# Patient Record
Sex: FEMALE | URBAN - METROPOLITAN AREA
[De-identification: names, ages, dates, MRNs, and addresses within clinical notes are randomized per-mention and may not be internally consistent; named-entity substitution may affect disease eponyms.]

---

## 2020-06-09 ENCOUNTER — APPOINTMENT (OUTPATIENT)
Age: 60
Setting detail: DERMATOLOGY
End: 2020-06-12

## 2020-06-09 DIAGNOSIS — I87.2 VENOUS INSUFFICIENCY (CHRONIC) (PERIPHERAL): ICD-10-CM

## 2020-06-09 PROCEDURE — OTHER RECOMMENDATIONS: OTHER

## 2020-06-09 PROCEDURE — OTHER DEFER: OTHER

## 2020-06-09 PROCEDURE — 99202 OFFICE O/P NEW SF 15 MIN: CPT

## 2020-06-09 PROCEDURE — OTHER PRESCRIPTION: OTHER

## 2020-06-09 PROCEDURE — OTHER TREATMENT REGIMEN: OTHER

## 2020-06-09 PROCEDURE — OTHER DIAGNOSIS COMMENT: OTHER

## 2020-06-09 PROCEDURE — OTHER MIPS QUALITY: OTHER

## 2020-06-09 PROCEDURE — OTHER COUNSELING: OTHER

## 2020-06-09 RX ORDER — FLURANDRENOLIDE 4 UG/CM2
TAPE TOPICAL
Qty: 1 | Refills: 6 | Status: ERX | COMMUNITY
Start: 2020-06-09

## 2020-06-09 ASSESSMENT — LOCATION ZONE DERM: LOCATION ZONE: LEG

## 2020-06-09 ASSESSMENT — LOCATION SIMPLE DESCRIPTION DERM: LOCATION SIMPLE: RIGHT PRETIBIAL REGION

## 2020-06-09 ASSESSMENT — LOCATION DETAILED DESCRIPTION DERM: LOCATION DETAILED: RIGHT DISTAL PRETIBIAL REGION

## 2020-06-09 NOTE — PROCEDURE: RECOMMENDATIONS
Recommendation Preamble: The following recommendations:
Detail Level: Zone
Recommendations (Free Text): Wear Compression stockings daily and elevate legs at night.

## 2020-06-09 NOTE — PROCEDURE: DEFER
Instructions (Optional): Will consider intralesional injections if current treatment is not improving condition
Introduction Text (Please End With A Colon): Defer:
Detail Level: Detailed

## 2020-06-09 NOTE — PROCEDURE: MIPS QUALITY
Detail Level: Detailed
Quality 226: Preventive Care And Screening: Tobacco Use: Screening And Cessation Intervention: Patient screened for tobacco use and is an ex/non-smoker
Quality 431: Preventive Care And Screening: Unhealthy Alcohol Use - Screening: Patient screened for unhealthy alcohol use using a single question and scores less than 2 times per year
Quality 130: Documentation Of Current Medications In The Medical Record: Current Medications Documented
Quality 154 Part B: Falls: Risk Screening (Should Be Reported With Measure 155.): No documentation of falls status
Quality 154 Part A: Falls: Risk Assessment (Should Be Reported With Measure 155.): Falls risk assessment completed and documented in the past 12 months.
Quality 110: Preventive Care And Screening: Influenza Immunization: Influenza Immunization Administered during Influenza season

## 2020-06-09 NOTE — PROCEDURE: TREATMENT REGIMEN
Initiate Treatment: Cordran tape
Continue Regimen: Triamcinolone cream prn for flares in between using cordran tape
Detail Level: Zone
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
## 2020-06-26 ENCOUNTER — RX ONLY (RX ONLY)
Age: 60
End: 2020-06-26

## 2020-06-26 RX ORDER — BETAMETHASONE VALERATE 1 MG/G
OINTMENT TOPICAL
Qty: 90 | Refills: 2 | Status: ERX | COMMUNITY
Start: 2020-06-26

## 2020-09-30 ENCOUNTER — APPOINTMENT (OUTPATIENT)
Age: 60
Setting detail: DERMATOLOGY
End: 2020-10-05

## 2020-09-30 DIAGNOSIS — I87.2 VENOUS INSUFFICIENCY (CHRONIC) (PERIPHERAL): ICD-10-CM

## 2020-09-30 PROCEDURE — OTHER TREATMENT REGIMEN: OTHER

## 2020-09-30 PROCEDURE — OTHER PRESCRIPTION: OTHER

## 2020-09-30 PROCEDURE — OTHER MIPS QUALITY: OTHER

## 2020-09-30 PROCEDURE — 99213 OFFICE O/P EST LOW 20 MIN: CPT

## 2020-09-30 PROCEDURE — OTHER COUNSELING: OTHER

## 2020-09-30 PROCEDURE — OTHER DIAGNOSIS COMMENT: OTHER

## 2020-09-30 PROCEDURE — OTHER RECOMMENDATIONS: OTHER

## 2020-09-30 RX ORDER — BETAMETHASONE DIPROPIONATE 0.5 MG/G
CREAM TOPICAL
Qty: 1 | Refills: 4 | Status: ERX | COMMUNITY
Start: 2020-09-30

## 2020-09-30 ASSESSMENT — LOCATION ZONE DERM: LOCATION ZONE: LEG

## 2020-09-30 ASSESSMENT — LOCATION DETAILED DESCRIPTION DERM: LOCATION DETAILED: RIGHT DISTAL PRETIBIAL REGION

## 2020-09-30 ASSESSMENT — LOCATION SIMPLE DESCRIPTION DERM: LOCATION SIMPLE: RIGHT PRETIBIAL REGION

## 2020-09-30 NOTE — PROCEDURE: TREATMENT REGIMEN
Modify Regimen: Discontinue Triamcinolone cream prn for itching and switch to Betamethasone cream bid for flares
Detail Level: Zone

## 2020-09-30 NOTE — PROCEDURE: RECOMMENDATIONS
Recommendation Preamble: The following recommendations:
Detail Level: Zone
Recommendations (Free Text): Wear Compression stockings daily and elevate legs at night. Suggested putting pillows and/or blankets under the end of bed. Advised patient to take an otc supplement called horse chestnut. Patient declined at this time and said she consider it at a later time. Discussed a possible vein revision with patient.

## 2020-09-30 NOTE — PROCEDURE: MIPS QUALITY
Quality 154 Part B: Falls: Risk Screening (Should Be Reported With Measure 155.): No documentation of falls status
Detail Level: Detailed
Quality 431: Preventive Care And Screening: Unhealthy Alcohol Use - Screening: Patient screened for unhealthy alcohol use using a single question and scores less than 2 times per year
Quality 226: Preventive Care And Screening: Tobacco Use: Screening And Cessation Intervention: Patient screened for tobacco use and is an ex/non-smoker
Quality 154 Part A: Falls: Risk Assessment (Should Be Reported With Measure 155.): Falls risk assessment completed and documented in the past 12 months.
Quality 130: Documentation Of Current Medications In The Medical Record: Current Medications Documented
Quality 110: Preventive Care And Screening: Influenza Immunization: Influenza Immunization Administered during Influenza season
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
